# Patient Record
Sex: FEMALE | Race: WHITE | HISPANIC OR LATINO | Employment: UNEMPLOYED | URBAN - METROPOLITAN AREA
[De-identification: names, ages, dates, MRNs, and addresses within clinical notes are randomized per-mention and may not be internally consistent; named-entity substitution may affect disease eponyms.]

---

## 2023-05-03 ENCOUNTER — HOSPITAL ENCOUNTER (EMERGENCY)
Facility: HOSPITAL | Age: 43
Discharge: HOME | End: 2023-05-03
Attending: EMERGENCY MEDICINE
Payer: COMMERCIAL

## 2023-05-03 ENCOUNTER — APPOINTMENT (OUTPATIENT)
Dept: GENERAL RADIOLOGY | Facility: HOSPITAL | Age: 43
End: 2023-05-03
Payer: COMMERCIAL

## 2023-05-03 VITALS
DIASTOLIC BLOOD PRESSURE: 77 MMHG | HEIGHT: 63 IN | SYSTOLIC BLOOD PRESSURE: 148 MMHG | HEART RATE: 66 BPM | TEMPERATURE: 98.6 F | WEIGHT: 164.68 LBS | OXYGEN SATURATION: 100 % | BODY MASS INDEX: 29.18 KG/M2 | RESPIRATION RATE: 14 BRPM

## 2023-05-03 DIAGNOSIS — U07.1 COVID-19: Primary | ICD-10-CM

## 2023-05-03 DIAGNOSIS — R19.7 DIARRHEA, UNSPECIFIED TYPE: ICD-10-CM

## 2023-05-03 LAB
ALBUMIN SERPL-MCNC: 4.2 G/DL (ref 3.5–5.2)
ALBUMIN/GLOB SERPL: 1.5 G/DL
ALP SERPL-CCNC: 70 U/L (ref 39–117)
ALT SERPL W P-5'-P-CCNC: 77 U/L (ref 1–33)
ANION GAP SERPL CALCULATED.3IONS-SCNC: 10 MMOL/L (ref 5–15)
AST SERPL-CCNC: 51 U/L (ref 1–32)
BASOPHILS # BLD AUTO: 0 10*3/MM3 (ref 0–0.2)
BASOPHILS NFR BLD AUTO: 0.4 % (ref 0–1.5)
BILIRUB SERPL-MCNC: 0.2 MG/DL (ref 0–1.2)
BILIRUB UR QL STRIP: NEGATIVE
BUN SERPL-MCNC: 12 MG/DL (ref 6–20)
BUN/CREAT SERPL: 17.4 (ref 7–25)
CALCIUM SPEC-SCNC: 8.8 MG/DL (ref 8.6–10.5)
CHLORIDE SERPL-SCNC: 103 MMOL/L (ref 98–107)
CLARITY UR: ABNORMAL
CO2 SERPL-SCNC: 25 MMOL/L (ref 22–29)
COLOR UR: YELLOW
CREAT SERPL-MCNC: 0.69 MG/DL (ref 0.57–1)
DEPRECATED RDW RBC AUTO: 44.2 FL (ref 37–54)
EGFRCR SERPLBLD CKD-EPI 2021: 112 ML/MIN/1.73
EOSINOPHIL # BLD AUTO: 0.1 10*3/MM3 (ref 0–0.4)
EOSINOPHIL NFR BLD AUTO: 1.4 % (ref 0.3–6.2)
ERYTHROCYTE [DISTWIDTH] IN BLOOD BY AUTOMATED COUNT: 14.2 % (ref 12.3–15.4)
FLUAV SUBTYP SPEC NAA+PROBE: NOT DETECTED
FLUBV RNA ISLT QL NAA+PROBE: NOT DETECTED
GLOBULIN UR ELPH-MCNC: 2.8 GM/DL
GLUCOSE SERPL-MCNC: 89 MG/DL (ref 65–99)
GLUCOSE UR STRIP-MCNC: NEGATIVE MG/DL
HCT VFR BLD AUTO: 40.4 % (ref 34–46.6)
HGB BLD-MCNC: 13 G/DL (ref 12–15.9)
HGB UR QL STRIP.AUTO: NEGATIVE
KETONES UR QL STRIP: ABNORMAL
LEUKOCYTE ESTERASE UR QL STRIP.AUTO: NEGATIVE
LIPASE SERPL-CCNC: 18 U/L (ref 13–60)
LYMPHOCYTES # BLD AUTO: 1.4 10*3/MM3 (ref 0.7–3.1)
LYMPHOCYTES NFR BLD AUTO: 23.2 % (ref 19.6–45.3)
MAGNESIUM SERPL-MCNC: 1.9 MG/DL (ref 1.6–2.6)
MCH RBC QN AUTO: 29.3 PG (ref 26.6–33)
MCHC RBC AUTO-ENTMCNC: 32.2 G/DL (ref 31.5–35.7)
MCV RBC AUTO: 91 FL (ref 79–97)
MONOCYTES # BLD AUTO: 0.4 10*3/MM3 (ref 0.1–0.9)
MONOCYTES NFR BLD AUTO: 6.7 % (ref 5–12)
NEUTROPHILS NFR BLD AUTO: 4.2 10*3/MM3 (ref 1.7–7)
NEUTROPHILS NFR BLD AUTO: 68.3 % (ref 42.7–76)
NITRITE UR QL STRIP: NEGATIVE
NRBC BLD AUTO-RTO: 0.1 /100 WBC (ref 0–0.2)
PH UR STRIP.AUTO: <=5 [PH] (ref 5–8)
PLATELET # BLD AUTO: 233 10*3/MM3 (ref 140–450)
PMV BLD AUTO: 8.4 FL (ref 6–12)
POTASSIUM SERPL-SCNC: 3.6 MMOL/L (ref 3.5–5.2)
PROT SERPL-MCNC: 7 G/DL (ref 6–8.5)
PROT UR QL STRIP: NEGATIVE
RBC # BLD AUTO: 4.44 10*6/MM3 (ref 3.77–5.28)
S PYO AG THROAT QL: NEGATIVE
SARS-COV-2 RNA RESP QL NAA+PROBE: DETECTED
SODIUM SERPL-SCNC: 138 MMOL/L (ref 136–145)
SP GR UR STRIP: 1.02 (ref 1–1.03)
UROBILINOGEN UR QL STRIP: ABNORMAL
WBC NRBC COR # BLD: 6.2 10*3/MM3 (ref 3.4–10.8)

## 2023-05-03 PROCEDURE — 83690 ASSAY OF LIPASE: CPT

## 2023-05-03 PROCEDURE — 87651 STREP A DNA AMP PROBE: CPT

## 2023-05-03 PROCEDURE — 80053 COMPREHEN METABOLIC PANEL: CPT

## 2023-05-03 PROCEDURE — 85025 COMPLETE CBC W/AUTO DIFF WBC: CPT

## 2023-05-03 PROCEDURE — 99284 EMERGENCY DEPT VISIT MOD MDM: CPT

## 2023-05-03 PROCEDURE — 81003 URINALYSIS AUTO W/O SCOPE: CPT

## 2023-05-03 PROCEDURE — 83735 ASSAY OF MAGNESIUM: CPT

## 2023-05-03 PROCEDURE — 87636 SARSCOV2 & INF A&B AMP PRB: CPT

## 2023-05-03 PROCEDURE — 71045 X-RAY EXAM CHEST 1 VIEW: CPT

## 2023-05-03 RX ORDER — SODIUM CHLORIDE 0.9 % (FLUSH) 0.9 %
10 SYRINGE (ML) INJECTION AS NEEDED
Status: DISCONTINUED | OUTPATIENT
Start: 2023-05-03 | End: 2023-05-03 | Stop reason: HOSPADM

## 2023-05-03 RX ADMIN — SODIUM CHLORIDE 1000 ML: 9 INJECTION, SOLUTION INTRAVENOUS at 18:58

## 2023-05-03 NOTE — ED NOTES
used to get information. Patient stated that she has been having a sore throat, sore ears, runny nose and diarrhea for 2 days.  Patient has been exposed to covid.

## 2023-05-04 NOTE — ED PROVIDER NOTES
Subjective   History of Present Illness  Patient is a 42-year-old  and Greek-speaking female presenting with complaints of diarrhea and flulike symptoms that has been ongoing for couple days but worsening today.  She reports sinus pressure and bilateral ear pain.  Patient believes that she has the flu that she has no known contacts.  She does state that there have been several people she lives with that have had COVID recently.  She has not had a cough or fever at home.  She denies chest pain, shortness of breath and dysuria.  She has no reported medical history and does not take any medication on a daily basis.  She has no known drug allergies.  She denies use of drugs, alcohol and tobacco.        Review of Systems   Constitutional: Negative for appetite change and fever.   HENT: Positive for ear pain and sinus pressure. Negative for congestion.    Respiratory: Negative for chest tightness and shortness of breath.    Cardiovascular: Negative for chest pain.   Gastrointestinal: Positive for diarrhea. Negative for nausea and vomiting.   Genitourinary: Negative for flank pain and urgency.   Musculoskeletal: Positive for myalgias.   Neurological: Negative for dizziness and weakness.   Psychiatric/Behavioral: Negative for confusion. The patient is not nervous/anxious.    All other systems reviewed and are negative.      History reviewed. No pertinent past medical history.    No Known Allergies    History reviewed. No pertinent surgical history.    History reviewed. No pertinent family history.    Social History     Socioeconomic History   • Marital status: Single           Objective   Physical Exam  Vitals and nursing note reviewed.   Constitutional:       General: She is awake. She is not in acute distress.     Appearance: Normal appearance. She is well-developed. She is not diaphoretic.   HENT:      Head: Normocephalic and atraumatic. No raccoon eyes or Aguirre's sign.      Right Ear: Tympanic membrane, ear  "canal and external ear normal. Tenderness present. Tympanic membrane is not erythematous.      Left Ear: Tympanic membrane, ear canal and external ear normal. Tenderness present. Tympanic membrane is not erythematous.      Mouth/Throat:      Mouth: Mucous membranes are moist.      Pharynx: Oropharynx is clear. Uvula midline. Posterior oropharyngeal erythema present. No oropharyngeal exudate or uvula swelling.      Comments: Erythema noted to oropharynx, worse on right than left with no uvular swelling or exudate.  Eyes:      Extraocular Movements: Extraocular movements intact.      Pupils: Pupils are equal, round, and reactive to light.   Cardiovascular:      Rate and Rhythm: Normal rate and regular rhythm.      Pulses: Normal pulses.      Heart sounds: Normal heart sounds. No murmur heard.  Pulmonary:      Effort: Pulmonary effort is normal.      Breath sounds: Normal breath sounds.   Abdominal:      General: Bowel sounds are normal.      Palpations: Abdomen is soft.   Musculoskeletal:         General: Normal range of motion.      Cervical back: Normal range of motion and neck supple.   Skin:     General: Skin is warm and dry.      Capillary Refill: Capillary refill takes less than 2 seconds.   Neurological:      General: No focal deficit present.      Mental Status: She is alert and oriented to person, place, and time. Mental status is at baseline.      GCS: GCS eye subscore is 4. GCS verbal subscore is 5. GCS motor subscore is 6.      Cranial Nerves: Cranial nerves 2-12 are intact.      Sensory: Sensation is intact.      Motor: Motor function is intact.      Deep Tendon Reflexes: Reflexes are normal and symmetric.   Psychiatric:         Mood and Affect: Mood normal.         Behavior: Behavior normal. Behavior is cooperative.         Procedures           ED Course      /77   Pulse 66   Temp 98.6 °F (37 °C) (Oral)   Resp 14   Ht 160 cm (63\")   Wt 74.7 kg (164 lb 10.9 oz)   LMP 04/15/2023   SpO2 100%   " BMI 29.17 kg/m²   Labs Reviewed   COVID-19 AND FLU A/B, NP SWAB IN TRANSPORT MEDIA 8-12 HR TAT - Abnormal; Notable for the following components:       Result Value    COVID19 Detected (*)     All other components within normal limits    Narrative:     Fact sheet for providers: https://www.fda.gov/media/526225/download    Fact sheet for patients: https://www.fda.gov/media/421484/download    Test performed by PCR.  Influenza A and Influenza B negative results should be considered presumptive in samples that have a positive SARS-CoV-2 result.    Competitive inhibition studies showed that SARS-CoV-2 virus, when present at concentrations above 3.6E+04 copies/mL, can inhibit the detection and amplification of influenza A and influenza B virus RNA if present at or below 1.8E+02 copies/mL or 4.9E+02 copies/mL, respectively, and may lead to false negative influenza virus results. If co-infection with influenza A or influenza B virus is suspected in samples with a positive SARS-CoV-2 result, the sample should be re-tested with another FDA cleared, approved, or authorized influenza test, if influenza virus detection would change clinical management.   COMPREHENSIVE METABOLIC PANEL - Abnormal; Notable for the following components:    ALT (SGPT) 77 (*)     AST (SGOT) 51 (*)     All other components within normal limits    Narrative:     GFR Normal >60  Chronic Kidney Disease <60  Kidney Failure <15     URINALYSIS W/ MICROSCOPIC IF INDICATED (NO CULTURE) - Abnormal; Notable for the following components:    Appearance, UA Hazy (*)     Ketones, UA Trace (*)     All other components within normal limits    Narrative:     Urine microscopic not indicated.   RAPID STREP A SCREEN - Normal   LIPASE - Normal   MAGNESIUM - Normal   CBC WITH AUTO DIFFERENTIAL - Normal   CBC AND DIFFERENTIAL    Narrative:     The following orders were created for panel order CBC & Differential.  Procedure                               Abnormality          Status                     ---------                               -----------         ------                     CBC Auto Differential[[id number]]        Normal              Final result                 Please view results for these tests on the individual orders.     Medications   sodium chloride 0.9 % bolus 1,000 mL (0 mL Intravenous Stopped 5/3/23 1928)     XR Chest 1 View    Result Date: 5/3/2023  No acute cardiopulmonary abnormality. Electronically Signed: Karina Lemos  5/3/2023 7:24 PM EDT  Workstation ID: [id number]                                         Medical Decision Making  Patient is a 42-year-old  female who is also Northern Irish-speaking.   was used for exam and result conversations.  Exam reveals bilateral ear tenderness without erythematous or bulging TMs.  Appropriate cones of light noted bilaterally without drainage or discharge.  No cervical lymphadenopathy.  Pupils PERRLA.  Oropharynx with mild erythema, most notably on the right side with no uvular or tonsillar swelling or exudate.  Patient is afebrile.  S1/S2 heard on exam without clicks or murmurs.  No JVD.  Abdomen is found to be soft and nontender with normal bowel sounds heard throughout.  Lungs clear on auscultation.  Initial differentials include COVID-19, influenza, strep pharyngitis, otitis media.    No strep, influenza detected on swab.  Patient was COVID-positive today.  CMP with mildly elevated liver enzymes but kidney function remains normal.  Electrolytes are within normal limits.  CBC without leukocytosis or anemia.  Urinalysis negative for findings consistent with acute UTI.  My interpretation of chest x-ray reveals no infiltrates, pneumothorax or nodules.  This is concurrent with radiologist interpretation as well.  Patient received a fluid bolus.  Upon reassessment, she reports feeling a little bit better after fluid bolus.  Again using , results were discussed with patient and her nausea and able to  answer questions that she had for me.  At this time, I do not believe that she is a Paxlovid candidate as her symptoms are mild.  She was advised to continue drinking lots of fluids and treat symptomatically as needed.  Patient verbalized understanding and is agreeable to discharge at this time.  She has remained hemodynamically stable and is in no acute distress.  She was able to ambulate upright with a steady gait upon discharge.    This document is intended for medical expert use only.  Reading of this document by patients and/or patient's family without participating medical staff guidance may result in misinterpretation and unintended morbidity.  Any interpretation of such data is the responsibility of the patient and/or family member responsible for the patient in concert with their primary or specialist providers, not to be left for sources of online search as such as [employer], [employer] or similar queries.  Relying on these approaches to knowledge may result in misinterpretation, misguided goals of care and even death should patient or family members try recommendations outside of the realm of professional medical care in a supervised inpatient environment.    This medical document was created using Dragon dictation system. Some errors in speech recognition may occur.    COVID-19: complicated acute illness or injury  Diarrhea, unspecified type: complicated acute illness or injury  Amount and/or Complexity of Data Reviewed  Labs: ordered.  Radiology: ordered.          Final diagnoses:   Diarrhea, unspecified type   COVID-19       ED Disposition  ED Disposition     ED Disposition   Discharge    Condition   Stable    Comment   --             PATIENT CONNECTION - [facility] [address]  [phone]             Medication List      No changes were made to your prescriptions during this visit.          Noemy Prater, APRN  05/04/23 9406

## 2023-05-04 NOTE — DISCHARGE INSTRUCTIONS
As we discussed, quarantine for 5 days after symptom onset.  Following that, he may return to normal activity but need to wear a mask for the following 5 days if you leave your house.  Alternate use of Tylenol and ibuprofen as needed for body aches and generalized pain.    Follow-up with primary care provider.  If you do not have a primary care provider, contact patient connection to establish 1.    Return to the ER for new or worsening symptoms

## 2024-06-28 ENCOUNTER — TRANSCRIBE ORDERS (OUTPATIENT)
Dept: PHYSICAL THERAPY | Facility: CLINIC | Age: 44
End: 2024-06-28
Payer: COMMERCIAL

## 2024-06-28 DIAGNOSIS — M54.2 CERVICALGIA: Primary | ICD-10-CM

## 2024-06-28 DIAGNOSIS — M25.511 BILATERAL SHOULDER PAIN, UNSPECIFIED CHRONICITY: ICD-10-CM

## 2024-06-28 DIAGNOSIS — M25.512 BILATERAL SHOULDER PAIN, UNSPECIFIED CHRONICITY: ICD-10-CM

## 2024-10-01 PROCEDURE — 87591 N.GONORRHOEAE DNA AMP PROB: CPT | Performed by: PHYSICIAN ASSISTANT

## 2024-10-01 PROCEDURE — 87661 TRICHOMONAS VAGINALIS AMPLIF: CPT | Performed by: PHYSICIAN ASSISTANT

## 2024-10-01 PROCEDURE — 87491 CHLMYD TRACH DNA AMP PROBE: CPT | Performed by: PHYSICIAN ASSISTANT

## 2024-10-25 ENCOUNTER — APPOINTMENT (OUTPATIENT)
Dept: CT IMAGING | Facility: HOSPITAL | Age: 44
End: 2024-10-25
Payer: COMMERCIAL

## 2024-10-25 ENCOUNTER — HOSPITAL ENCOUNTER (EMERGENCY)
Facility: HOSPITAL | Age: 44
Discharge: HOME | End: 2024-10-25
Attending: EMERGENCY MEDICINE
Payer: COMMERCIAL

## 2024-10-25 VITALS
OXYGEN SATURATION: 100 % | WEIGHT: 171.6 LBS | RESPIRATION RATE: 16 BRPM | HEIGHT: 63 IN | SYSTOLIC BLOOD PRESSURE: 120 MMHG | TEMPERATURE: 98 F | HEART RATE: 68 BPM | BODY MASS INDEX: 30.41 KG/M2 | DIASTOLIC BLOOD PRESSURE: 69 MMHG

## 2024-10-25 DIAGNOSIS — R53.1 GENERALIZED WEAKNESS: ICD-10-CM

## 2024-10-25 DIAGNOSIS — G47.00 INSOMNIA, UNSPECIFIED TYPE: ICD-10-CM

## 2024-10-25 DIAGNOSIS — R51.9 ACUTE NONINTRACTABLE HEADACHE, UNSPECIFIED HEADACHE TYPE: Primary | ICD-10-CM

## 2024-10-25 DIAGNOSIS — R10.32 LEFT LOWER QUADRANT ABDOMINAL PAIN: ICD-10-CM

## 2024-10-25 LAB
ALBUMIN SERPL-MCNC: 3.9 G/DL (ref 3.5–5.2)
ALBUMIN/GLOB SERPL: 1.5 G/DL
ALP SERPL-CCNC: 71 U/L (ref 39–117)
ALT SERPL W P-5'-P-CCNC: 18 U/L (ref 1–33)
ANION GAP SERPL CALCULATED.3IONS-SCNC: 5.3 MMOL/L (ref 5–15)
AST SERPL-CCNC: 15 U/L (ref 1–32)
B-HCG UR QL: NEGATIVE
BACTERIA UR QL AUTO: ABNORMAL /HPF
BASOPHILS # BLD AUTO: 0.03 10*3/MM3 (ref 0–0.2)
BASOPHILS NFR BLD AUTO: 0.7 % (ref 0–1.5)
BILIRUB SERPL-MCNC: 0.2 MG/DL (ref 0–1.2)
BILIRUB UR QL STRIP: NEGATIVE
BUN SERPL-MCNC: 15 MG/DL (ref 6–20)
BUN/CREAT SERPL: 18.5 (ref 7–25)
CALCIUM SPEC-SCNC: 8.7 MG/DL (ref 8.6–10.5)
CHLORIDE SERPL-SCNC: 104 MMOL/L (ref 98–107)
CLARITY UR: CLEAR
CO2 SERPL-SCNC: 29.7 MMOL/L (ref 22–29)
COLOR UR: YELLOW
CREAT SERPL-MCNC: 0.81 MG/DL (ref 0.57–1)
DEPRECATED RDW RBC AUTO: 45.8 FL (ref 37–54)
EGFRCR SERPLBLD CKD-EPI 2021: 93.1 ML/MIN/1.73
EOSINOPHIL # BLD AUTO: 0.15 10*3/MM3 (ref 0–0.4)
EOSINOPHIL NFR BLD AUTO: 3.5 % (ref 0.3–6.2)
ERYTHROCYTE [DISTWIDTH] IN BLOOD BY AUTOMATED COUNT: 13.3 % (ref 12.3–15.4)
GLOBULIN UR ELPH-MCNC: 2.6 GM/DL
GLUCOSE SERPL-MCNC: 102 MG/DL (ref 65–99)
GLUCOSE UR STRIP-MCNC: NEGATIVE MG/DL
HCT VFR BLD AUTO: 37.9 % (ref 34–46.6)
HGB BLD-MCNC: 12 G/DL (ref 12–15.9)
HGB UR QL STRIP.AUTO: ABNORMAL
HYALINE CASTS UR QL AUTO: ABNORMAL /LPF
IMM GRANULOCYTES # BLD AUTO: 0.01 10*3/MM3 (ref 0–0.05)
IMM GRANULOCYTES NFR BLD AUTO: 0.2 % (ref 0–0.5)
KETONES UR QL STRIP: NEGATIVE
LEUKOCYTE ESTERASE UR QL STRIP.AUTO: NEGATIVE
LYMPHOCYTES # BLD AUTO: 1.27 10*3/MM3 (ref 0.7–3.1)
LYMPHOCYTES NFR BLD AUTO: 29.3 % (ref 19.6–45.3)
MCH RBC QN AUTO: 29 PG (ref 26.6–33)
MCHC RBC AUTO-ENTMCNC: 31.7 G/DL (ref 31.5–35.7)
MCV RBC AUTO: 91.5 FL (ref 79–97)
MONOCYTES # BLD AUTO: 0.42 10*3/MM3 (ref 0.1–0.9)
MONOCYTES NFR BLD AUTO: 9.7 % (ref 5–12)
NEUTROPHILS NFR BLD AUTO: 2.45 10*3/MM3 (ref 1.7–7)
NEUTROPHILS NFR BLD AUTO: 56.6 % (ref 42.7–76)
NITRITE UR QL STRIP: NEGATIVE
PH UR STRIP.AUTO: 6 [PH] (ref 5–8)
PLATELET # BLD AUTO: 260 10*3/MM3 (ref 140–450)
PMV BLD AUTO: 10 FL (ref 6–12)
POTASSIUM SERPL-SCNC: 3.6 MMOL/L (ref 3.5–5.2)
PROT SERPL-MCNC: 6.5 G/DL (ref 6–8.5)
PROT UR QL STRIP: NEGATIVE
RBC # BLD AUTO: 4.14 10*6/MM3 (ref 3.77–5.28)
RBC # UR STRIP: ABNORMAL /HPF
REF LAB TEST METHOD: ABNORMAL
SODIUM SERPL-SCNC: 139 MMOL/L (ref 136–145)
SP GR UR STRIP: 1.02 (ref 1–1.03)
SQUAMOUS #/AREA URNS HPF: ABNORMAL /HPF
UROBILINOGEN UR QL STRIP: ABNORMAL
WBC # UR STRIP: ABNORMAL /HPF
WBC NRBC COR # BLD AUTO: 4.33 10*3/MM3 (ref 3.4–10.8)

## 2024-10-25 PROCEDURE — 85025 COMPLETE CBC W/AUTO DIFF WBC: CPT

## 2024-10-25 PROCEDURE — 99285 EMERGENCY DEPT VISIT HI MDM: CPT

## 2024-10-25 PROCEDURE — 25810000003 SODIUM CHLORIDE 0.9 % SOLUTION

## 2024-10-25 PROCEDURE — 81025 URINE PREGNANCY TEST: CPT | Performed by: EMERGENCY MEDICINE

## 2024-10-25 PROCEDURE — 99283 EMERGENCY DEPT VISIT LOW MDM: CPT

## 2024-10-25 PROCEDURE — 25510000001 IOPAMIDOL PER 1 ML: Performed by: EMERGENCY MEDICINE

## 2024-10-25 PROCEDURE — 80053 COMPREHEN METABOLIC PANEL: CPT

## 2024-10-25 PROCEDURE — 81001 URINALYSIS AUTO W/SCOPE: CPT | Performed by: EMERGENCY MEDICINE

## 2024-10-25 PROCEDURE — 74177 CT ABD & PELVIS W/CONTRAST: CPT

## 2024-10-25 PROCEDURE — 36415 COLL VENOUS BLD VENIPUNCTURE: CPT

## 2024-10-25 RX ORDER — IOPAMIDOL 755 MG/ML
100 INJECTION, SOLUTION INTRAVASCULAR
Status: COMPLETED | OUTPATIENT
Start: 2024-10-25 | End: 2024-10-25

## 2024-10-25 RX ADMIN — SODIUM CHLORIDE 500 ML: 9 INJECTION, SOLUTION INTRAVENOUS at 21:09

## 2024-10-25 RX ADMIN — IOPAMIDOL 100 ML: 755 INJECTION, SOLUTION INTRAVENOUS at 21:07

## 2024-10-25 NOTE — Clinical Note
[facility] [place] [id number] [place] [address] [place] [address]  Phone: [phone]    Rubi James was seen and treated in our emergency department on 10/25/2024.  She may return to work on 10/29/2024.         Thank you for choosing [facility].    Tawnya Bolivar, KIMBERLI

## 2024-10-26 NOTE — FSED PROVIDER NOTE
Subjective   History of Present Illness  Patient is a well-appearing 43-year-old Lithuanian-speaking female who reports to the ER today with complaints of headache, insomnia, blurry vision, diarrhea, generalized weakness, heavy menstrual period, and abdominal pain that began 3 weeks ago.  Patient reports she was seen by her primary care provider and they adjusted her levothyroxine today.  She denies headache at this time, nausea, vomiting, shortness of air, chest pain.        Review of Systems   Gastrointestinal:  Positive for abdominal pain.   Genitourinary:  Positive for hematuria, menstrual problem and vaginal bleeding.   Psychiatric/Behavioral:  Positive for sleep disturbance.    All other systems reviewed and are negative.      Past Medical History:   Diagnosis Date    Disease of thyroid gland        No Known Allergies    Past Surgical History:   Procedure Laterality Date     SECTION         History reviewed. No pertinent family history.    Social History     Socioeconomic History    Marital status: Single   Tobacco Use    Smoking status: Never     Passive exposure: Never    Smokeless tobacco: Never   Vaping Use    Vaping status: Never Used   Substance and Sexual Activity    Alcohol use: Yes    Drug use: Not Currently    Sexual activity: Yes     Partners: Male           Objective   Physical Exam  Vitals and nursing note reviewed.   Constitutional:       General: She is not in acute distress.     Appearance: Normal appearance. She is not ill-appearing, toxic-appearing or diaphoretic.   HENT:      Head: Normocephalic.      Right Ear: Tympanic membrane, ear canal and external ear normal. There is no impacted cerumen.      Left Ear: Tympanic membrane, ear canal and external ear normal. There is no impacted cerumen.      Nose: Nose normal.      Mouth/Throat:      Mouth: Mucous membranes are moist.      Pharynx: No oropharyngeal exudate or posterior oropharyngeal erythema.   Eyes:      General:         Right  eye: No discharge.         Left eye: No discharge.      Pupils: Pupils are equal, round, and reactive to light.   Cardiovascular:      Rate and Rhythm: Normal rate and regular rhythm.      Pulses: Normal pulses.      Heart sounds: Normal heart sounds. No murmur heard.     No friction rub. No gallop.   Pulmonary:      Effort: Pulmonary effort is normal. No respiratory distress.      Breath sounds: Normal breath sounds. No stridor. No wheezing, rhonchi or rales.   Chest:      Chest wall: No tenderness.   Abdominal:      General: Abdomen is flat. Bowel sounds are normal. There is no distension.      Palpations: Abdomen is soft. There is no mass.      Tenderness: There is abdominal tenderness. There is no right CVA tenderness, left CVA tenderness, guarding or rebound.      Hernia: No hernia is present.          Comments: Patient was tender in left upper quadrant, and left lower quadrant.   Musculoskeletal:         General: No swelling, tenderness, deformity or signs of injury. Normal range of motion.      Cervical back: Normal range of motion. No rigidity or tenderness.      Right lower leg: No edema.      Left lower leg: No edema.   Skin:     General: Skin is warm.      Capillary Refill: Capillary refill takes less than 2 seconds.      Coloration: Skin is not jaundiced or pale.      Findings: No bruising, erythema, lesion or rash.   Neurological:      General: No focal deficit present.      Mental Status: She is alert and oriented to person, place, and time.   Psychiatric:         Mood and Affect: Mood normal.         Procedures           ED Course  ED Course as of 10/25/24 2205   Fri Oct 25, 2024   2147 CT result  IMPRESSION:  Impression:  No evidence of acute intra-abdominal abnormality.     Multiple soft tissue density nodules seen within the gluteal subcutaneous tissues bilaterally most likely reflects prior cosmetic procedure. Recommend correlation with procedural history.        [CW]   2148 Glucose(!): 102 [CW]    2148 BUN: 15 [CW]   2148 Creatinine: 0.81 [CW]   2148 Sodium: 139 [CW]   2148 Potassium: 3.6 [CW]   2148 Chloride: 104 [CW]   2148 CO2(!): 29.7 [CW]   2148 Calcium: 8.7 [CW]   2148 Total Protein: 6.5 [CW]   2148 Albumin: 3.9 [CW]   2148 ALT (SGPT): 18 [CW]   2148 Alkaline Phosphatase: 71 [CW]   2148 Total Bilirubin: 0.2 [CW]   2148 Globulin: 2.6 [CW]   2148 A/G Ratio: 1.5 [CW]   2148 BUN/Creatinine Ratio: 18.5 [CW]   2148 Anion Gap: 5.3 [CW]   2148 eGFR: 93.1 [CW]   2148 WBC: 4.33 [CW]   2148 RBC: 4.14 [CW]   2148 Hemoglobin: 12.0 [CW]   2148 Hematocrit: 37.9 [CW]   2148 MCV: 91.5 [CW]   2148 MCH: 29.0 [CW]   2148 MCHC: 31.7 [CW]   2148 RDW: 13.3 [CW]   2148 RDW-SD: 45.8 [CW]   2148 MPV: 10.0 [CW]   2148 Platelets: 260 [CW]   2148 Neutrophil Rel %: 56.6 [CW]   2148 Lymphocyte Rel %: 29.3 [CW]   2148 Monocyte Rel %: 9.7 [CW]   2148 Eosinophil Rel %: 3.5 [CW]   2148 Basophil Rel %: 0.7 [CW]   2148 Immature Granulocyte Rel %: 0.2 [CW]   2148 Neutrophils Absolute: 2.45 [CW]   2148 Lymphocytes Absolute: 1.27 [CW]   2148 Monocytes Absolute: 0.42 [CW]   2148 Eosinophils Absolute: 0.15 [CW]   2148 Basophils Absolute: 0.03 [CW]   2148 Immature Grans, Absolute: 0.01 [CW]   2148 HCG, Urine QL: Negative [CW]   2148 Color, UA: Yellow [CW]   2148 Appearance, UA: Clear [CW]   2148 pH, UA: 6.0 [CW]   2148 Specific Gravity, UA: 1.020 [CW]   2148 Glucose: Negative [CW]   2148 Ketones, UA: Negative [CW]   2148 Bilirubin, UA: Negative [CW]   2148 Blood, UA(!): Large (3+) [CW]   2148 Protein, UA: Negative [CW]   2148 Leukocytes, UA: Negative [CW]   2148 Nitrite, UA: Negative [CW]   2148 Urobilinogen, UA: 0.2 E.U./dL [CW]      ED Course User Index  [CW] Tawnya Bolivar, APRN                                           Medical Decision Making  Patient is a well-appearing 43-year-old Burkinan-speaking female who reports to the ER today with complaints of headache, insomnia, blurry vision, diarrhea, generalized weakness, heavy menstrual  period, and abdominal pain that began 3 weeks ago.  I used  [id number] to obtain history, and complete physical exam.  Patient reports she was seen by her primary care provider and they adjusted her levothyroxine today.  She denies headache at this time, nausea, vomiting, shortness of air, chest pain.  I differential includes diverticulitis, ovarian cyst, inguinal hernia, colitis, menses.  Upon exam patient is awake, alert, nontoxic-appearing, and appears in no acute distress.  Her heart is normal rate and rhythm.  Lung sounds are clear and equal bilaterally.  Abdomen is soft, and tender in left upper quadrant, and left lower quadrant.  Mucous membranes are moist oropharynx is free of erythema, exudate or lesions.  Was able to visualize bilateral TMs and they were free of erythema, exudate, bulging, retractions or hematoma.  Her neuroexam was unremarkable, and she denies headache at this time.  She reports she has been taking ibuprofen, that has helped with the pain.  She reports using approximately 4 pads a day since the beginning of her.  I have ordered a CBC and CMP that were unremarkable, and a urinalysis that did show blood, but patient is currently on her menstrual cycle.  I have ordered a CT scan that was unremarkable.  I used  [id number] to discuss results with patient.  I have advised patient to follow-up with gynecologist for further examination of her vaginal bleeding.  I have also advised her to consider taking magnesium glycinate as needed for sleep.  I have advised her to follow-up with her primary care in the next few days to continue evaluation.  Advised her to return to the ER if she develops shortness of air, chest pain, or any new or worsening of symptoms.     Amount and/or Complexity of Data Reviewed  Labs: ordered. Decision-making details documented in ED Course.  Radiology: ordered.    Risk  Prescription drug management.        Final diagnoses:   Acute nonintractable headache,  unspecified headache type   Generalized weakness   Insomnia, unspecified type   Left lower quadrant abdominal pain       ED Disposition  ED Disposition       ED Disposition   Discharge    Condition   Stable    Comment   --               PATIENT CONNECTION - [facility]  [facility] [address]  [phone]  Schedule an appointment as soon as possible for a visit in 3 days  If symptoms worsen         Medication List      No changes were made to your prescriptions during this visit.

## 2024-10-26 NOTE — DISCHARGE INSTRUCTIONS
Follow-up with gynecologist if vaginal bleeding persist.    Follow-up with primary care in 3 days if symptoms persist.    Continue stay hydrated.    Consider taking magnesium glycinate per  instructions for sleep.    Return to the ER if you develop shortness of air, chest pain, new or worsening of symptoms.

## 2025-03-10 ENCOUNTER — TRANSCRIBE ORDERS (OUTPATIENT)
Dept: ADMINISTRATIVE | Facility: HOSPITAL | Age: 45
End: 2025-03-10
Payer: COMMERCIAL

## 2025-03-10 DIAGNOSIS — Z12.31 ENCOUNTER FOR SCREENING MAMMOGRAM FOR MALIGNANT NEOPLASM OF BREAST: Primary | ICD-10-CM

## 2025-03-20 ENCOUNTER — HOSPITAL ENCOUNTER (OUTPATIENT)
Dept: MAMMOGRAPHY | Facility: HOSPITAL | Age: 45
Discharge: HOME | End: 2025-03-20